# Patient Record
Sex: FEMALE | Race: WHITE | NOT HISPANIC OR LATINO | Employment: UNEMPLOYED | URBAN - METROPOLITAN AREA
[De-identification: names, ages, dates, MRNs, and addresses within clinical notes are randomized per-mention and may not be internally consistent; named-entity substitution may affect disease eponyms.]

---

## 2022-08-29 ENCOUNTER — HOSPITAL ENCOUNTER (EMERGENCY)
Facility: HOSPITAL | Age: 32
Discharge: HOME | End: 2022-08-29
Attending: STUDENT IN AN ORGANIZED HEALTH CARE EDUCATION/TRAINING PROGRAM | Admitting: STUDENT IN AN ORGANIZED HEALTH CARE EDUCATION/TRAINING PROGRAM

## 2022-08-29 VITALS
RESPIRATION RATE: 18 BRPM | HEIGHT: 64 IN | OXYGEN SATURATION: 98 % | BODY MASS INDEX: 17.43 KG/M2 | WEIGHT: 102.07 LBS | TEMPERATURE: 98.8 F | HEART RATE: 84 BPM | SYSTOLIC BLOOD PRESSURE: 101 MMHG | DIASTOLIC BLOOD PRESSURE: 64 MMHG

## 2022-08-29 DIAGNOSIS — H65.02 NON-RECURRENT ACUTE SEROUS OTITIS MEDIA OF LEFT EAR: Primary | ICD-10-CM

## 2022-08-29 PROCEDURE — 99282 EMERGENCY DEPT VISIT SF MDM: CPT

## 2022-08-29 RX ORDER — AMOXICILLIN 500 MG/1
500 CAPSULE ORAL 2 TIMES DAILY
Qty: 20 CAPSULE | Refills: 0 | Status: SHIPPED | OUTPATIENT
Start: 2022-08-29 | End: 2022-09-08

## 2022-08-29 RX ORDER — AMOXICILLIN 500 MG/1
500 CAPSULE ORAL 2 TIMES DAILY
Qty: 20 CAPSULE | Refills: 0 | Status: SHIPPED | OUTPATIENT
Start: 2022-08-29 | End: 2022-08-29 | Stop reason: SDUPTHER

## 2022-09-24 PROCEDURE — 87086 URINE CULTURE/COLONY COUNT: CPT

## 2022-10-27 ENCOUNTER — TELEPHONE (OUTPATIENT)
Dept: FAMILY MEDICINE CLINIC | Facility: CLINIC | Age: 32
End: 2022-10-27

## 2022-10-27 NOTE — TELEPHONE ENCOUNTER
SPOKE WITH PATIENT IN REGARDS TO MISSED APPOINTMENT ON October 18TH @ 1 PM. PATIENT DID NOT WANT TO RESCHEDULE APPOINTMENT. PATIENT IS AWARE OF NO SHOW CANCELLATION POLICY.

## 2023-07-31 ENCOUNTER — TELEPHONE (OUTPATIENT)
Dept: FAMILY MEDICINE CLINIC | Facility: CLINIC | Age: 33
End: 2023-07-31
Payer: COMMERCIAL

## 2023-07-31 DIAGNOSIS — F31.62 BIPOLAR DISORDER, CURRENT EPISODE MIXED, MODERATE: ICD-10-CM

## 2023-07-31 RX ORDER — LURASIDONE HYDROCHLORIDE 40 MG/1
40 TABLET, FILM COATED ORAL DAILY
Qty: 30 TABLET | Refills: 2 | Status: SHIPPED | OUTPATIENT
Start: 2023-07-31

## 2023-08-23 ENCOUNTER — OFFICE VISIT (OUTPATIENT)
Dept: FAMILY MEDICINE CLINIC | Facility: CLINIC | Age: 33
End: 2023-08-23
Payer: COMMERCIAL

## 2023-08-23 VITALS
BODY MASS INDEX: 18.27 KG/M2 | WEIGHT: 107 LBS | OXYGEN SATURATION: 98 % | HEIGHT: 64 IN | HEART RATE: 67 BPM | TEMPERATURE: 97.9 F

## 2023-08-23 DIAGNOSIS — H66.006 RECURRENT ACUTE SUPPURATIVE OTITIS MEDIA WITHOUT SPONTANEOUS RUPTURE OF TYMPANIC MEMBRANE OF BOTH SIDES: Primary | ICD-10-CM

## 2023-08-23 PROCEDURE — 99213 OFFICE O/P EST LOW 20 MIN: CPT | Performed by: NURSE PRACTITIONER

## 2023-08-23 RX ORDER — CIPROFLOXACIN AND DEXAMETHASONE 3; 1 MG/ML; MG/ML
4 SUSPENSION/ DROPS AURICULAR (OTIC) 2 TIMES DAILY
Qty: 7.5 ML | Refills: 0 | Status: SHIPPED | OUTPATIENT
Start: 2023-08-23

## 2023-08-23 RX ORDER — FLUCONAZOLE 150 MG/1
150 TABLET ORAL ONCE
Qty: 1 TABLET | Refills: 0 | Status: SHIPPED | OUTPATIENT
Start: 2023-08-23 | End: 2023-08-23

## 2023-08-23 RX ORDER — AMOXICILLIN AND CLAVULANATE POTASSIUM 875; 125 MG/1; MG/1
1 TABLET, FILM COATED ORAL 2 TIMES DAILY
Qty: 20 TABLET | Refills: 0 | Status: SHIPPED | OUTPATIENT
Start: 2023-08-23

## 2023-08-23 NOTE — PROGRESS NOTES
"Chief Complaint  Earache (Bilateral earache, pt reports mainly Rt ear)    Subjective        Medical History: has a past medical history of Anxiety and Bipolar depression.     Surgical History: has a past surgical history that includes  section and Laparoscopic tubal ligation.     Family History: family history is not on file.     Social History: reports that she has been smoking cigarettes. She has a 10.00 pack-year smoking history. She has never used smokeless tobacco. She reports current alcohol use. She reports that she does not use drugs.    Ange Reilly presents to [facility]  History of Present Illness  r  Earache   There is pain in both (right worse than left) ears. This is a new problem. The current episode started yesterday. The problem occurs constantly. The problem has been waxing and waning. There has been no fever. The pain is moderate. Pertinent negatives include no abdominal pain, coughing, headaches, neck pain, sore throat or vomiting. She has tried nothing for the symptoms. The treatment provided no relief. Her past medical history is significant for a chronic ear infection.     Objective   Vital Signs:   Pulse 67   Temp 97.9 øF (36.6 øC) (Temporal)   Ht 162.6 cm (64\")   Wt 48.5 kg (107 lb)   SpO2 98%   BMI 18.37 kg/mý       Wt Readings from Last 3 Encounters:   23 48.5 kg (107 lb)   23 48.1 kg (106 lb)   23 49.1 kg (108 lb 3.2 oz)        BP Readings from Last 3 Encounters:   23 90/64   23 90/56   22 116/65        BMI is below normal parameters (malnutrition). Recommendations: none (medical contraindication)       Physical Exam  Vitals reviewed.   Constitutional:       Appearance: Normal appearance. She is well-developed.   HENT:      Head: Normocephalic and atraumatic.      Right Ear: No drainage. A middle ear effusion is present. Tympanic membrane is injected and erythematous. Tympanic membrane is not perforated. "      Left Ear: No drainage. A middle ear effusion is present. Tympanic membrane is injected and erythematous. Tympanic membrane is not perforated.   Eyes:      Conjunctiva/sclera: Conjunctivae normal.      Pupils: Pupils are equal, round, and reactive to light.   Cardiovascular:      Rate and Rhythm: Normal rate and regular rhythm.      Heart sounds: No murmur heard.  Pulmonary:      Effort: Pulmonary effort is normal.      Breath sounds: Normal breath sounds. No wheezing or rhonchi.   Skin:     General: Skin is warm and dry.   Neurological:      Mental Status: She is alert and oriented to person, place, and time.   Psychiatric:         Mood and Affect: Mood and affect normal.         Behavior: Behavior normal.         Thought Content: Thought content normal.         Judgment: Judgment normal.      Result Review :  {The following data was reviewed by ARIES Chapman on 08/23/2023.  Common labs          7/11/2023    11:35   Common Labs   Glucose 92    BUN 6    Creatinine 0.71    Sodium 142    Potassium 4.1    Chloride 106    Calcium 9.5    Albumin 4.8    Total Bilirubin 1.2    Alkaline Phosphatase 69    AST (SGOT) 16    ALT (SGPT) 11    WBC 4.00    Hemoglobin 15.3    Hematocrit 44.5    Platelets 249    Total Cholesterol 155    Triglycerides 67    HDL Cholesterol 44    LDL Cholesterol  98      Data reviewed : Previous urgent care notes             Current Outpatient Medications on File Prior to Visit   Medication Sig Dispense Refill    fluticasone (FLONASE) 50 MCG/ACT nasal spray 2 sprays into the nostril(s) as directed by provider Daily. 16 g 2    Lurasidone HCl (LATUDA) 40 MG tablet tablet Take 1 tablet by mouth Daily. 30 tablet 2    cetirizine (zyrTEC) 10 MG tablet Take 1 tablet by mouth Daily. (Patient not taking: Reported on 8/23/2023) 30 tablet 5     No current facility-administered medications on file prior to visit.        Assessment and Plan  Diagnoses and all orders for this visit:    1. Recurrent  acute suppurative otitis media without spontaneous rupture of tympanic membrane of both sides (Primary)    Other orders  -     ciprofloxacin-dexAMETHasone (Ciprodex) 0.3-0.1 % otic suspension; Administer 4 drops into both ears 2 (Two) Times a Day.  Dispense: 7.5 mL; Refill: 0  -     amoxicillin-clavulanate (AUGMENTIN) 875-125 MG per tablet; Take 1 tablet by mouth 2 (Two) Times a Day.  Dispense: 20 tablet; Refill: 0  -     fluconazole (Diflucan) 150 MG tablet; Take 1 tablet by mouth 1 (One) Time for 1 dose.  Dispense: 1 tablet; Refill: 0    Patient has an appointment coming up with ENT January 2024.  She states she has been getting recurrent ear infections for the last several years, had childhood history of chronic ear infection with tube placement.  We will start patient on Augmentin, she does at times get yeast infection we will send over Diflucan and also start on ciprofloxacin eardrops to help with the infection.  Discussed return precautions.  Patient verbalized understanding.    Follow Up   No follow-ups on file.  Patient was given instructions and counseling regarding her condition or for health maintenance advice. Please see specific information pulled into the AVS if appropriate.       Part of this note may be electronic transcription/translation of spoken language to printed text using the Dragon dictation system

## 2023-12-13 ENCOUNTER — OFFICE VISIT (OUTPATIENT)
Dept: FAMILY MEDICINE CLINIC | Facility: CLINIC | Age: 33
End: 2023-12-13
Payer: COMMERCIAL

## 2023-12-13 VITALS
OXYGEN SATURATION: 97 % | SYSTOLIC BLOOD PRESSURE: 102 MMHG | TEMPERATURE: 98 F | HEIGHT: 64 IN | HEART RATE: 67 BPM | BODY MASS INDEX: 19.31 KG/M2 | WEIGHT: 113.1 LBS | DIASTOLIC BLOOD PRESSURE: 64 MMHG

## 2023-12-13 DIAGNOSIS — H93.8X3 PRESSURE SENSATION IN BOTH EARS: ICD-10-CM

## 2023-12-13 DIAGNOSIS — R05.1 ACUTE COUGH: ICD-10-CM

## 2023-12-13 DIAGNOSIS — R09.81 SINUS CONGESTION: ICD-10-CM

## 2023-12-13 DIAGNOSIS — R51.9 HEADACHE AROUND THE EYES: ICD-10-CM

## 2023-12-13 DIAGNOSIS — J01.10 ACUTE NON-RECURRENT FRONTAL SINUSITIS: Primary | ICD-10-CM

## 2023-12-13 PROCEDURE — 99213 OFFICE O/P EST LOW 20 MIN: CPT

## 2023-12-13 PROCEDURE — 1159F MED LIST DOCD IN RCRD: CPT

## 2023-12-13 PROCEDURE — 1160F RVW MEDS BY RX/DR IN RCRD: CPT

## 2023-12-13 RX ORDER — TRAZODONE HYDROCHLORIDE 50 MG/1
50 TABLET ORAL
COMMUNITY
Start: 2023-12-04

## 2023-12-13 RX ORDER — PRAZOSIN HYDROCHLORIDE 1 MG/1
1 CAPSULE ORAL
COMMUNITY
Start: 2023-12-04

## 2023-12-13 RX ORDER — GUAIFENESIN AND DEXTROMETHORPHAN HYDROBROMIDE 600; 30 MG/1; MG/1
2 TABLET, EXTENDED RELEASE ORAL 2 TIMES DAILY
Qty: 28 TABLET | Refills: 0 | Status: SHIPPED | OUTPATIENT
Start: 2023-12-13 | End: 2023-12-20

## 2023-12-13 RX ORDER — HYDROXYZINE HYDROCHLORIDE 10 MG/1
10 TABLET, FILM COATED ORAL 3 TIMES DAILY PRN
COMMUNITY
Start: 2023-12-04

## 2023-12-13 RX ORDER — AMOXICILLIN AND CLAVULANATE POTASSIUM 875; 125 MG/1; MG/1
1 TABLET, FILM COATED ORAL 2 TIMES DAILY
Qty: 14 TABLET | Refills: 0 | Status: SHIPPED | OUTPATIENT
Start: 2023-12-13 | End: 2023-12-20

## 2023-12-13 NOTE — PROGRESS NOTES
"Chief Complaint  Chief Complaint   Patient presents with    Headache    Cough    Nasal Congestion    Earache       Subjective      Ange Reilly presents to [facility] with complaints of sinus congestion, cough, headache, and bilateral ear pain and pressure.    She has not been feeling well since this weekend, 2023 to 12/10/2023. She believed she had an upper respiratory infection. She was off all weekend and rested. She has had a severe frontal headache since Monday, 2023. She does not feel feverish, but she \"feels hot.\" She has nasal congestion, a sore throat, bilateral ear pain and pressure, and had rhinorrhea this morning, 2023, with green mucus. She is not expectorating phlegm. She has sinus infections with weather changes. Her children have been sick. She feels pain in her ears when the wind blows. She is not taking any over-the-counter medications. She denies any GI symptoms, nausea, or vomiting. Augmentin worked well for her for her sinus infection in 2023. She declines influenza and COVID-19 testing.     Objective     Medical History:  Past Medical History:   Diagnosis Date    Anxiety     Bipolar depression      Past Surgical History:   Procedure Laterality Date     SECTION      LAPAROSCOPIC TUBAL LIGATION        Social History     Tobacco Use    Smoking status: Every Day     Packs/day: 1.00     Years: 10.00     Additional pack years: 0.00     Total pack years: 10.00     Types: Cigarettes    Smokeless tobacco: Never   Vaping Use    Vaping Use: Every day    Substances: Nicotine   Substance Use Topics    Alcohol use: Yes     Comment: Occ    Drug use: Never     History reviewed. No pertinent family history.    Medications:  Prior to Admission medications    Medication Sig Start Date End Date Taking? Authorizing Provider   cetirizine (zyrTEC) 10 MG tablet Take 1 tablet by mouth Daily. 23  Yes Bev Rojas APRN   fluticasone " "(FLONASE) 50 MCG/ACT nasal spray 2 sprays into the nostril(s) as directed by provider Daily. 7/11/23  Yes Bev Rojas APRN   hydrOXYzine (ATARAX) 10 MG tablet Take 1 tablet by mouth 3 (Three) Times a Day As Needed for Anxiety. for anxiety 12/4/23  Yes ProviderKatharina MD   prazosin (MINIPRESS) 1 MG capsule Take 1 capsule by mouth every night at bedtime. 12/4/23  Yes Provider, MD Katharina   sertraline (ZOLOFT) 50 MG tablet Take 1 tablet by mouth every night at bedtime. 12/4/23  Yes ProviderKatharina MD   traZODone (DESYREL) 50 MG tablet Take 1 tablet by mouth every night at bedtime. 12/4/23  Yes ProviderKatharina MD   amoxicillin-clavulanate (AUGMENTIN) 875-125 MG per tablet Take 1 tablet by mouth 2 (Two) Times a Day. 8/23/23   Tracy Selby APRN   ciprofloxacin-dexAMETHasone (Ciprodex) 0.3-0.1 % otic suspension Administer 4 drops into both ears 2 (Two) Times a Day.  Patient not taking: Reported on 12/13/2023 8/23/23   Tracy Selby APRN   Lurasidone HCl (LATUDA) 40 MG tablet tablet Take 1 tablet by mouth Daily.  Patient not taking: Reported on 12/13/2023 7/31/23   Bev Rojas APRN        Allergies:   Patient has no known allergies.    Health Maintenance Due   Topic Date Due    Pneumococcal Vaccine 0-64 (1 - PCV) Never done    TDAP/TD VACCINES (1 - Tdap) Never done    HEPATITIS C SCREENING  Never done    PAP SMEAR  Never done    INFLUENZA VACCINE  Never done         Vital Signs:   /64   Pulse 67   Temp 98 °F (36.7 °C)   Ht 162.6 cm (64\")   Wt 51.3 kg (113 lb 1.6 oz)   SpO2 97%   BMI 19.41 kg/m²     Wt Readings from Last 3 Encounters:   12/13/23 51.3 kg (113 lb 1.6 oz)   08/23/23 48.5 kg (107 lb)   07/11/23 48.1 kg (106 lb)     BP Readings from Last 3 Encounters:   12/13/23 102/64   07/11/23 90/64   05/30/23 90/56       BMI is within normal parameters. No other follow-up for BMI required.       Physical Exam  Vitals reviewed.   Constitutional:       " Appearance: Normal appearance. She is well-developed.   HENT:      Head: Normocephalic and atraumatic.      Right Ear: Tympanic membrane normal. No tenderness.      Left Ear: Tympanic membrane normal. No tenderness.      Nose: Congestion present.      Right Sinus: Frontal sinus tenderness present.      Left Sinus: Frontal sinus tenderness present.      Mouth/Throat:      Pharynx: Posterior oropharyngeal erythema present.   Eyes:      Conjunctiva/sclera: Conjunctivae normal.      Pupils: Pupils are equal, round, and reactive to light.   Cardiovascular:      Rate and Rhythm: Normal rate and regular rhythm.      Heart sounds: No murmur heard.     No friction rub. No gallop.   Pulmonary:      Effort: Pulmonary effort is normal.      Breath sounds: Normal breath sounds. No wheezing or rhonchi.   Skin:     General: Skin is warm and dry.   Neurological:      Mental Status: She is alert.   Psychiatric:         Mood and Affect: Affect normal.         Result Review :    The following data was reviewed by ARIES Gilliam on 12/14/23 at 08:15 EST:        No Images in the past 120 days found..             Assessment and Plan    Diagnoses and all orders for this visit:    1. Acute non-recurrent frontal sinusitis (Primary)  -     amoxicillin-clavulanate (AUGMENTIN) 875-125 MG per tablet; Take 1 tablet by mouth 2 (Two) Times a Day for 7 days.  Dispense: 14 tablet; Refill: 0  -     guaifenesin-dextromethorphan (MUCINEX DM)  MG tablet sustained-release 12 hour tablet; Take 2 tablets by mouth 2 (Two) Times a Day for 7 days.  Dispense: 28 tablet; Refill: 0    2. Sinus congestion    3. Acute cough    4. Headache around the eyes    5. Pressure sensation in both ears       1. Sinusitis.   Augmentin twice daily for 1 week and Mucinex DM will be prescribed. She was advised to drink plenty of water.        Follow Up   Return if symptoms worsen or fail to improve.  Patient was given instructions and counseling regarding her  condition or for health maintenance advice. Please see specific information pulled into the AVS if appropriate.     Please note that portions of this note were completed with a voice recognition program.    Transcribed from ambient dictation for ARIES Gilliam by Lorena Sow.  12/13/23   17:34 EST    Patient or patient representative verbalized consent to the visit recording.  I have personally performed the services described in this document as transcribed by the above individual, and it is both accurate and complete.

## 2024-06-20 ENCOUNTER — OFFICE VISIT (OUTPATIENT)
Dept: OTOLARYNGOLOGY | Facility: CLINIC | Age: 34
End: 2024-06-20
Payer: COMMERCIAL

## 2024-06-20 ENCOUNTER — PROCEDURE VISIT (OUTPATIENT)
Dept: OTOLARYNGOLOGY | Facility: CLINIC | Age: 34
End: 2024-06-20
Payer: COMMERCIAL

## 2024-06-20 VITALS — BODY MASS INDEX: 19.15 KG/M2 | TEMPERATURE: 97.2 F | WEIGHT: 112.2 LBS | HEIGHT: 64 IN

## 2024-06-20 DIAGNOSIS — H69.93 DYSFUNCTION OF BOTH EUSTACHIAN TUBES: Primary | ICD-10-CM

## 2024-06-20 DIAGNOSIS — H90.3 SENSORINEURAL HEARING LOSS (SNHL) OF BOTH EARS: ICD-10-CM

## 2024-06-20 DIAGNOSIS — Z72.0 TOBACCO ABUSE: ICD-10-CM

## 2024-06-20 DIAGNOSIS — H90.A32 MIXED CONDUCTIVE AND SENSORINEURAL HEARING LOSS OF LEFT EAR WITH RESTRICTED HEARING OF RIGHT EAR: ICD-10-CM

## 2024-06-20 DIAGNOSIS — H90.A31 MIXED CONDUCTIVE AND SENSORINEURAL HEARING LOSS OF RIGHT EAR WITH RESTRICTED HEARING OF LEFT EAR: ICD-10-CM

## 2024-06-20 RX ORDER — HYDROXYZINE HYDROCHLORIDE 25 MG/1
25 TABLET, FILM COATED ORAL 3 TIMES DAILY PRN
COMMUNITY
Start: 2024-03-05

## 2024-06-20 RX ORDER — MIRTAZAPINE 15 MG/1
15 TABLET, FILM COATED ORAL
COMMUNITY
Start: 2024-03-05

## 2024-06-20 RX ORDER — CITALOPRAM HYDROBROMIDE 10 MG/1
10 TABLET ORAL
COMMUNITY
Start: 2024-03-05

## 2024-06-20 NOTE — PROGRESS NOTES
Patient Name: Ange Reilly   Visit Date: 2024   Patient ID: [id number]  Provider: Eliseo Cross MD    Sex: female  Location: [facility]   YOB: 1990  Location Address: [address] [place] [address], [address],?[place]?[id number]    Primary Care Provider Bev Rojas, ARIES  Location Phone: [phone]    Referring Provider: Bev Rojas, *        Chief Complaint   Cholesteatoma of ear and Hearing Loss (New Patient )    Subjective    History of Present Illness  Ange Reilly is a 33 y.o. female who presents to [facility] today as a consult from Bev Rojas, *.    She presents to the clinic today for evaluation of her ears and hearing.  I see her daughter is one of my patients who has some difficulty with eustachian tube dysfunction.  She informs me that she also had difficulty with recurrent ear infections when she was a child, did have ear tubes placed, and her adenoids removed for this.  Her left ear tube extruded within a few years, but the right ear tube persisted and she had a subsequent surgery when she was around 19 years of age to have the tube removed and the eardrum patched.  She seems to be doing well in general and is not having frequent infections, but notes that she has some difficulty with her hearing, slightly more so on the right side.    Past Medical History:   Diagnosis Date    Anxiety     Bipolar depression        Past Surgical History:   Procedure Laterality Date    ADENOIDECTOMY       SECTION      EAR TUBES      LAPAROSCOPIC TUBAL LIGATION      TONSILLECTOMY           Current Outpatient Medications:     cetirizine (zyrTEC) 10 MG tablet, Take 1 tablet by mouth Daily., Disp: 30 tablet, Rfl: 5    citalopram (CeleXA) 10 MG tablet, Take 1 tablet by mouth every night at bedtime., Disp: , Rfl:     fluticasone (FLONASE) 50 MCG/ACT nasal spray, 2 sprays into the nostril(s) as directed by  "provider Daily., Disp: 16 g, Rfl: 2    hydrOXYzine (ATARAX) 25 MG tablet, Take 1 tablet by mouth 3 (Three) Times a Day As Needed. for anxiety, Disp: , Rfl:     mirtazapine (REMERON) 15 MG tablet, Take 1 tablet by mouth every night at bedtime., Disp: , Rfl:     prazosin (MINIPRESS) 1 MG capsule, Take 1 capsule by mouth every night at bedtime., Disp: , Rfl:     sertraline (ZOLOFT) 50 MG tablet, Take 1 tablet by mouth every night at bedtime., Disp: , Rfl:     traZODone (DESYREL) 50 MG tablet, Take 1 tablet by mouth every night at bedtime., Disp: , Rfl:     ciprofloxacin-dexAMETHasone (Ciprodex) 0.3-0.1 % otic suspension, Administer 4 drops into both ears 2 (Two) Times a Day. (Patient not taking: Reported on 12/13/2023), Disp: 7.5 mL, Rfl: 0     No Known Allergies    Family History   Family history unknown: Yes        Social History     Social History Narrative    Not on file       Objective     Vital Signs:   Temp 97.2 °F (36.2 °C) (Tympanic)   Ht 162.6 cm (64\")   Wt 50.9 kg (112 lb 3.2 oz)   BMI 19.26 kg/m²       Physical Exam    Constitutional   Appearance  : well developed, well-nourished, alert and in no acute distress, voice clear and strong    Head  Inspection  : no deformities or lesions  Face  Inspection  : No facial lesions; House-Brackmann I/VI bilaterally  Palpation  : No TMJ crepitus nor  muscle tenderness bilaterally    Eyes  Vision  Visual Fields  : Extraocular movements are intact. No spontaneous or gaze-induced nystagmus.  Conjunctivae  : clear  Sclerae  : clear  Pupils and Irises  : pupils equal, round, and reactive to light.     Ears, Nose, Mouth and Throat    Ears    External Ears  : appearance within normal limits, no lesions present  Otoscopic Examination  : Tympanic membrane appearance with myringosclerosis bilaterally otherwise within normal limits bilaterally without perforations, well-aerated middle ears  Hearing  : intact to conversational voice both ears  Tunning fork " testing:     :    Nose    External Nose  : appearance normal  Intranasal Exam  : mucosa within normal limits, vestibules normal, no intranasal lesions present, septum midline, sinuses non tender to percussion  Oral Cavity    Oral Mucosa  : oral mucosa normal without pallor or cyanosis  Lips  : lip appearance normal  Teeth  : normal dentition for age  Gums  : gums pink, non-swollen, no bleeding present  Tongue  : tongue appearance normal; normal mobility  Palate  : hard palate normal, soft palate appearance normal with symmetric mobility    Throat    Oropharynx  : no inflammation or lesions present, tonsils surgically absent with well-healed tonsillar fossa  Hypopharynx  : appearance within normal limits, superior epiglottis within normal limits  Larynx  : appearance within normal limits, vocal cords within normal limits, no lesions present    Neck  Inspection/Palpation  : normal appearance, no masses or tenderness, trachea midline; thyroid size normal, nontender, no nodules or masses present on palpation    Respiratory  Respiratory Effort  : breathing unlabored  Inspection of Chest  : normal appearance, no retractions    Cardiovascular  Heart  : regular rate and rhythm    Lymphatic  Neck  : no lymphadenopathy present  Supraclavicular Nodes  : no lymphadenopathy present  Preauricular Nodes  : no lymphadenopathy present    Skin and Subcutaneous Tissue  General Inspection  : Regarding face and neck - there are no rashes present, no lesions present, and no areas of discoloration    Neurologic  Cranial Nerves  : cranial nerves II-XII are grossly intact bilaterally  Gait and Station  : normal gait, able to stand without diffculty    Psychiatric  Judgement and Insight  : judgment and insight intact  Mood and Affect  : mood normal, affect appropriate              Assessment and Plan    Diagnoses and all orders for this visit:    1. Dysfunction of both eustachian tubes (Primary)  -     Comprehensive Hearing Test; Future  -      Tympanometry; Future    2. Sensorineural hearing loss (SNHL) of both ears  -     Comprehensive Hearing Test; Future  -     Tympanometry; Future    Examination today revealed myringosclerosis on both sides, slightly worse on the right side.  She does have some mild retraction on the left, but otherwise well aerated middle ears on both sides.  I discussed the findings with her, did recommend an audiogram which was performed today.  This reveals mild to moderate mixed hearing loss in both ears with tympanometry indicative of restricted tympanic membrane movement.  Word discrimination scores are 100% bilaterally.  I discussed the audiogram findings and since her ears do look stable I think a lot of the issues she is having stems from previous infections as a child.  There is no evidence of fluid on exam.  I did recommend hearing aids for her if she is having difficulty hearing due to the, and provided her with a number of audiologist here in town that can help her.  I will be glad to see her back on an as-needed basis should she have any continued difficulties.  We discussed smoking cessation and how this can impact her ears, and she will try to cut down with a goal to quit.    Follow Up   No follow-ups on file.  Patient was given instructions and counseling regarding her condition or for health maintenance advice. Please see specific information pulled into the AVS if appropriate.

## 2024-06-20 NOTE — PROGRESS NOTES
AUDIOMETRIC EVALUATION      Name:  Ange Reilly  :  1990  Age:  33 y.o.  Date of Evaluation:  2024       History:  Ms. Reilly is seen today for a hearing evaluation due to history of otitis media.    Audiologic Information:  Concerns for Hearing: Yes  PETs: Currently no  Other otologic surgical history: None  Aural Pressure/Fullness: Yes  Otalgia: No  Otorrhea: None noted  Tinnitus: No  Dizziness: None  Noise Exposure: No  Family history of hearing loss: No  Head trauma requiring hospital stay: No  Chemotherapy: None  Other significant history: Multiple PE tubes as a child    EVALUATION:    See audiogram    RESULTS:    Otoscopic Evaluation:        NOTE: Testing completed after ears were examined by Dr. Morales.    Tympanometry (226 Hz):  Right: Type B, Normal ECV  Left: Type B, Normal ECV    IMPRESSIONS:  Pure tone thresholds for the right ear indicates a mild and moderate mixed hearing loss.  Pure tone thresholds for the left ear indicates a mild and moderate mixed hearing loss at most speech tones.  Patient was counseled with regard to the findings.    RECOMMENDATIONS/PLAN:  Follow-up recommendations of Dr. Silva.  Discussed results and recommendations with patient. Questions were addressed and the patient was encouraged to contact our department should concerns arise.          Cornell Saenz M.S, [facility]  Licensed Audiologist

## 2024-06-21 PROBLEM — H90.3 SENSORINEURAL HEARING LOSS (SNHL) OF BOTH EARS: Status: ACTIVE | Noted: 2024-06-21

## 2024-06-21 PROBLEM — Z72.0 TOBACCO ABUSE: Status: ACTIVE | Noted: 2024-06-21

## 2024-06-21 PROBLEM — H69.93 DYSFUNCTION OF BOTH EUSTACHIAN TUBES: Status: ACTIVE | Noted: 2024-06-21

## 2024-10-03 ENCOUNTER — OFFICE VISIT (OUTPATIENT)
Dept: FAMILY MEDICINE CLINIC | Facility: CLINIC | Age: 34
End: 2024-10-03
Payer: COMMERCIAL

## 2024-10-03 VITALS
OXYGEN SATURATION: 97 % | HEIGHT: 64 IN | HEART RATE: 80 BPM | WEIGHT: 108.5 LBS | BODY MASS INDEX: 18.52 KG/M2 | SYSTOLIC BLOOD PRESSURE: 94 MMHG | TEMPERATURE: 98 F | DIASTOLIC BLOOD PRESSURE: 64 MMHG

## 2024-10-03 DIAGNOSIS — M25.552 BILATERAL HIP PAIN: ICD-10-CM

## 2024-10-03 DIAGNOSIS — M54.50 CHRONIC MIDLINE LOW BACK PAIN WITHOUT SCIATICA: ICD-10-CM

## 2024-10-03 DIAGNOSIS — G89.29 CHRONIC MIDLINE LOW BACK PAIN WITHOUT SCIATICA: ICD-10-CM

## 2024-10-03 DIAGNOSIS — Z91.414 HISTORY OF ABUSE BY INTIMATE PARTNER IN ADULTHOOD: ICD-10-CM

## 2024-10-03 DIAGNOSIS — Z00.00 ANNUAL PHYSICAL EXAM: Primary | ICD-10-CM

## 2024-10-03 DIAGNOSIS — M25.551 BILATERAL HIP PAIN: ICD-10-CM
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
## 2024-10-03 NOTE — PROGRESS NOTES
Chief Complaint  Chief Complaint   Patient presents with    Annual Exam    Hip Pain     Bilateral hip pain, had injury 3 years ago       Subjective      Ange Reilly presents to [facility]  History of Present Illness        Objective     Medical History:  Past Medical History:   Diagnosis Date    Anxiety     Bipolar depression      Past Surgical History:   Procedure Laterality Date    ADENOIDECTOMY       SECTION      EAR TUBES      LAPAROSCOPIC TUBAL LIGATION      TONSILLECTOMY        Social History     Tobacco Use    Smoking status: Every Day     Current packs/day: 1.00     Average packs/day: 1 pack/day for 10.0 years (10.0 ttl pk-yrs)     Types: Cigarettes     Passive exposure: Current    Smokeless tobacco: Never   Vaping Use    Vaping status: Former   Substance Use Topics    Alcohol use: Yes     Comment: Occ    Drug use: Never     Family History   Family history unknown: Yes       Medications:  Prior to Admission medications    Medication Sig Start Date End Date Taking? Authorizing Provider   cetirizine (zyrTEC) 10 MG tablet Take 1 tablet by mouth Daily. 23  Yes Bev Rojas APRN   citalopram (CeleXA) 10 MG tablet Take 1 tablet by mouth every night at bedtime. 3/5/24  Yes Katharina Grace MD   fluticasone (FLONASE) 50 MCG/ACT nasal spray 2 sprays into the nostril(s) as directed by provider Daily. 23  Yes Bev Rojas APRN   hydrOXYzine (ATARAX) 25 MG tablet Take 1 tablet by mouth 3 (Three) Times a Day As Needed. for anxiety 3/5/24  Yes Katharina Grace MD   mirtazapine (REMERON) 15 MG tablet Take 1 tablet by mouth every night at bedtime. 3/5/24  Yes Katharina Grace MD   prazosin (MINIPRESS) 1 MG capsule Take 1 capsule by mouth every night at bedtime. 23  Yes Katharina Grace MD   sertraline (ZOLOFT) 50 MG tablet Take 1 tablet by mouth every night at bedtime. 23  Yes Provider, Historical, MD   traZODone  "(DESYREL) 50 MG tablet Take 1 tablet by mouth every night at bedtime. 12/4/23  Yes Provider, MD Katharina   ciprofloxacin-dexAMETHasone (Ciprodex) 0.3-0.1 % otic suspension Administer 4 drops into both ears 2 (Two) Times a Day. 8/23/23   Erwin SelbyARIES King        Allergies:   Patient has no known allergies.    Health Maintenance Due   Topic Date Due    Pneumococcal Vaccine 0-64 (1 of 2 - PCV) Never done    TDAP/TD VACCINES (1 - Tdap) Never done    HEPATITIS C SCREENING  Never done    PAP SMEAR  Never done    ANNUAL PHYSICAL  07/11/2024    COVID-19 Vaccine (1 - 2023-24 season) Never done         Vital Signs:   BP 94/64 (BP Location: Left arm, Patient Position: Sitting, Cuff Size: Small Adult)   Pulse 80   Temp 98 °F (36.7 °C) (Oral)   Ht 162.6 cm (64\")   Wt 49.2 kg (108 lb 8 oz)   SpO2 97%   BMI 18.62 kg/m²     Wt Readings from Last 3 Encounters:   10/03/24 49.2 kg (108 lb 8 oz)   06/20/24 50.9 kg (112 lb 3.2 oz)   12/13/23 51.3 kg (113 lb 1.6 oz)     BP Readings from Last 3 Encounters:   10/03/24 94/64   12/13/23 102/64   07/11/23 90/64       BMI is within normal parameters. No other follow-up for BMI required.       Physical Exam  Vitals reviewed.   Constitutional:       Appearance: Normal appearance. She is well-developed.   HENT:      Head: Normocephalic and atraumatic.   Eyes:      Conjunctiva/sclera: Conjunctivae normal.      Pupils: Pupils are equal, round, and reactive to light.   Cardiovascular:      Rate and Rhythm: Normal rate and regular rhythm.      Heart sounds: No murmur heard.     No friction rub. No gallop.   Pulmonary:      Effort: Pulmonary effort is normal.      Breath sounds: Normal breath sounds. No wheezing or rhonchi.   Abdominal:      General: Bowel sounds are normal. There is no distension.      Palpations: Abdomen is soft.      Tenderness: There is no abdominal tenderness.   Skin:     General: Skin is warm and dry.   Neurological:      Mental Status: She is alert and " "oriented to person, place, and time.      Cranial Nerves: No cranial nerve deficit.   Psychiatric:         Mood and Affect: Mood and affect normal.         Behavior: Behavior normal.         Thought Content: Thought content normal.         Judgment: Judgment normal.       Physical Exam        Result Review :    The following data was reviewed by ARIES Gilliam on 10/03/24 at 13:34 EDT:    {[facility] Ambulatory Labs (Optional):49143}    No Images in the past 120 days found..    Results                 Assessment and Plan    There are no diagnoses linked to this encounter.   Assessment & Plan        {Time Spent (Optional):14907}    Smoking Cessation:    Ange Reilly  reports that she has been smoking cigarettes. She has a 10 pack-year smoking history. She has been exposed to tobacco smoke. She has never used smokeless tobacco. I have educated her on the risk of diseases from using tobacco products such as {Tobacco Cessation Diseases:09195::\"cancer\",\"COPD\",\"heart disease\"}.     I advised her to quit and she is {Willing/Not Willing to Quit Tobacco Products:44937}.    I spent {Time Spent Tobacco :17643} minutes counseling the patient.            Follow Up   No follow-ups on file.  Patient was given instructions and counseling regarding her condition or for health maintenance advice. Please see specific information pulled into the AVS if appropriate.     Please note that portions of this note were completed with a voice recognition program.    {[facility] Provider Statement:87755}  "

## 2024-10-08 ENCOUNTER — HOSPITAL ENCOUNTER (OUTPATIENT)
Dept: GENERAL RADIOLOGY | Facility: HOSPITAL | Age: 34
Discharge: HOME | End: 2024-10-08
Payer: COMMERCIAL

## 2024-10-08 DIAGNOSIS — M25.551 BILATERAL HIP PAIN: ICD-10-CM

## 2024-10-08 DIAGNOSIS — Z91.414 HISTORY OF ABUSE BY INTIMATE PARTNER IN ADULTHOOD: ICD-10-CM

## 2024-10-08 DIAGNOSIS — M25.552 BILATERAL HIP PAIN: ICD-10-CM

## 2024-10-08 PROCEDURE — 72110 X-RAY EXAM L-2 SPINE 4/>VWS: CPT

## 2024-10-08 PROCEDURE — 72072 X-RAY EXAM THORAC SPINE 3VWS: CPT

## 2024-10-08 PROCEDURE — 73521 X-RAY EXAM HIPS BI 2 VIEWS: CPT

## 2024-11-05 ENCOUNTER — OFFICE VISIT (OUTPATIENT)
Dept: FAMILY MEDICINE CLINIC | Facility: CLINIC | Age: 34
End: 2024-11-05
Payer: COMMERCIAL

## 2024-11-05 VITALS
HEIGHT: 64 IN | DIASTOLIC BLOOD PRESSURE: 54 MMHG | SYSTOLIC BLOOD PRESSURE: 84 MMHG | WEIGHT: 105.4 LBS | OXYGEN SATURATION: 98 % | TEMPERATURE: 98.2 F | BODY MASS INDEX: 17.99 KG/M2 | HEART RATE: 85 BPM

## 2024-11-05 DIAGNOSIS — I95.9 HYPOTENSION, UNSPECIFIED HYPOTENSION TYPE: ICD-10-CM

## 2024-11-05 DIAGNOSIS — Z01.419 ENCOUNTER FOR PAPANICOLAOU SMEAR OF VAGINA AS PART OF ROUTINE GYNECOLOGICAL EXAMINATION: Primary | ICD-10-CM

## 2024-11-05 DIAGNOSIS — N89.8 VAGINAL DISCHARGE: ICD-10-CM

## 2024-11-05 DIAGNOSIS — Z12.72 ENCOUNTER FOR PAPANICOLAOU SMEAR OF VAGINA AS PART OF ROUTINE GYNECOLOGICAL EXAMINATION: Primary | ICD-10-CM

## 2024-11-05 LAB
CANDIDA SPECIES: NEGATIVE
GARDNERELLA VAGINALIS: POSITIVE
T VAGINALIS DNA VAG QL PROBE+SIG AMP: POSITIVE

## 2024-11-05 PROCEDURE — 99395 PREV VISIT EST AGE 18-39: CPT

## 2024-11-05 PROCEDURE — 87480 CANDIDA DNA DIR PROBE: CPT

## 2024-11-05 PROCEDURE — G0123 SCREEN CERV/VAG THIN LAYER: HCPCS

## 2024-11-05 PROCEDURE — 87660 TRICHOMONAS VAGIN DIR PROBE: CPT

## 2024-11-05 PROCEDURE — 87624 HPV HI-RISK TYP POOLED RSLT: CPT

## 2024-11-05 PROCEDURE — 87510 GARDNER VAG DNA DIR PROBE: CPT

## 2024-11-05 PROCEDURE — 2014F MENTAL STATUS ASSESS: CPT

## 2024-11-05 PROCEDURE — 1125F AMNT PAIN NOTED PAIN PRSNT: CPT

## 2024-11-05 NOTE — PROGRESS NOTES
"  ENCOUNTER DATE:  11/05/2024    Ange NOVOA Tommie / 34 y.o. / female      CHIEF COMPLAINT     Gynecologic Exam      VITALS     Visit Vitals  BP (!) 84/54 (BP Location: Left arm, Patient Position: Sitting, Cuff Size: Small Adult)   Pulse 85   Temp 98.2 °F (36.8 °C) (Oral)   Ht 162.6 cm (64\")   Wt 47.8 kg (105 lb 6.4 oz)   SpO2 98%   BMI 18.09 kg/m²       BP Readings from Last 3 Encounters:   11/05/24 (!) 84/54   10/03/24 94/64   12/13/23 102/64     Wt Readings from Last 3 Encounters:   11/05/24 47.8 kg (105 lb 6.4 oz)   10/03/24 49.2 kg (108 lb 8 oz)   06/20/24 50.9 kg (112 lb 3.2 oz)      Body mass index is 18.09 kg/m².    MEDICATIONS     Current Outpatient Medications on File Prior to Visit   Medication Sig Dispense Refill    cetirizine (zyrTEC) 10 MG tablet Take 1 tablet by mouth Daily. 30 tablet 5    citalopram (CeleXA) 10 MG tablet Take 1 tablet by mouth every night at bedtime.      fluticasone (FLONASE) 50 MCG/ACT nasal spray 2 sprays into the nostril(s) as directed by provider Daily. 16 g 2    hydrOXYzine (ATARAX) 25 MG tablet Take 1 tablet by mouth 3 (Three) Times a Day As Needed. for anxiety      mirtazapine (REMERON) 15 MG tablet Take 1 tablet by mouth every night at bedtime.      prazosin (MINIPRESS) 1 MG capsule Take 1 capsule by mouth every night at bedtime.      sertraline (ZOLOFT) 50 MG tablet Take 1 tablet by mouth every night at bedtime.      traZODone (DESYREL) 50 MG tablet Take 1 tablet by mouth every night at bedtime.       No current facility-administered medications on file prior to visit.         HISTORY OF PRESENT ILLNESS     Ange presents for annual health maintenance visit.    Obstetric History:  OB History    No obstetric history on file.        Menstrual History:     No LMP recorded. (Menstrual status: Tubal ligation).         No results found for: \"HPVAPTIMA\"    History of Present Illness  The patient is a 34-year-old female who comes in today for routine gynecologic exam with Pap " smear.    Her last menstrual period was on 10/22/2024. Her last Pap smear was greater than 5 years ago.  She denies ever having an abnormal Pap smear.  She is currently sexually active with no concern for STDs.  She denies any vaginal concerns at this time.  She has never had a mammogram and denies any significant family history of breast cancer.    She is hypotensive on exam today with a blood pressure of 84/54.  She denies any dizziness, syncope/presyncope.  She does typically have lower blood pressure readings, but not as low as today with a systolic reading in the 80s.  She was prescribed prazosin 1 mg to be taken nightly for nightmares/PTSD.  This is prescribed to her by psychiatry.  She states that she does not take it daily and did not take it recently.  She says that she feels she does eat and drink plenty, although she does drink a lot of coffee.  She also smokes marijuana regularly.            Last health maintenance visit: more than 5 years ago  General health: good  Lifestyle:  Attempting to lose weight?: No   Diet: eats moderately healthy  Exercise: generally stays active (work/exercise)  Tobacco: Regular use (~1 pack/day)   Alcohol: occasional/infrequent  Work: Full-time  Reproductive health:  Sexually active?: Yes   Sexual problems?: No problems  Concern for STD?: No    Sees Gynecologist?: No   Suki/Postmenopausal?: No   Domestic abuse concerns: No   - Depression Screenin/5/2024     2:08 PM   PHQ-2/PHQ-9 Depression Screening   Little interest or pleasure in doing things Not at all   Feeling down, depressed, or hopeless Several days   How difficult have these problems made it for you to do your work, take care of things at home, or get along with other people? Not difficult at all         PHQ-2: 0 (Not depressed)   PHQ-9: 0 (Negative screening for depression)    Patient Care Team:  Bev Rojas APRN as PCP - General (Family Medicine)      ALLERGIES  No Known Allergies     PFSH:      The following portions of the patient's history were reviewed and updated as appropriate: Allergies / Current Medications / Past Medical History / Surgical History / Social History / Family History    PROBLEM LIST   Patient Active Problem List   Diagnosis    Tobacco abuse    Sensorineural hearing loss (SNHL) of both ears    Dysfunction of both eustachian tubes       PAST MEDICAL HISTORY  Past Medical History:   Diagnosis Date    Anxiety     Bipolar depression        SURGICAL HISTORY  Past Surgical History:   Procedure Laterality Date    ADENOIDECTOMY       SECTION      EAR TUBES      LAPAROSCOPIC TUBAL LIGATION      TONSILLECTOMY         SOCIAL HISTORY  Social History     Socioeconomic History    Marital status: Legally    Tobacco Use    Smoking status: Every Day     Current packs/day: 1.00     Average packs/day: 1 pack/day for 10.0 years (10.0 ttl pk-yrs)     Types: Cigarettes     Passive exposure: Current    Smokeless tobacco: Never   Vaping Use    Vaping status: Some Days    Substances: Flavoring    Devices: Disposable   Substance and Sexual Activity    Alcohol use: Yes     Comment: Occ    Drug use: Never    Sexual activity: Defer       FAMILY HISTORY  Family History   Family history unknown: Yes       IMMUNIZATION HISTORY    There is no immunization history on file for this patient.      PHYSICAL EXAMINATION     Physical Exam  Vitals reviewed. Exam conducted with a chaperone present.   Constitutional:       General: She is not in acute distress.     Appearance: Normal appearance. She is well-developed. She is not diaphoretic.   HENT:      Head: Normocephalic and atraumatic. Hair is normal.      Right Ear: Hearing normal. No decreased hearing noted. No drainage.      Left Ear: Hearing normal. No decreased hearing noted.      Nose: Nose normal. No nasal deformity.      Mouth/Throat:      Mouth: Mucous membranes are moist.   Eyes:      General: Lids are normal.      Extraocular Movements:  Extraocular movements intact.      Conjunctiva/sclera: Conjunctivae normal.      Pupils: Pupils are equal, round, and reactive to light.   Cardiovascular:      Rate and Rhythm: Normal rate and regular rhythm.      Pulses: Normal pulses.      Heart sounds: Normal heart sounds. No murmur heard.     No friction rub. No gallop.   Pulmonary:      Effort: Pulmonary effort is normal. No respiratory distress.      Breath sounds: Normal breath sounds. No wheezing or rales.   Chest:   Breasts:     Breasts are symmetrical.      Right: Normal. No mass, nipple discharge, skin change or tenderness.      Left: Normal. No mass, nipple discharge, skin change or tenderness.   Abdominal:      General: Bowel sounds are normal.      Palpations: Abdomen is soft.   Genitourinary:     General: Normal vulva.      Pubic Area: No rash.       Labia:         Right: No rash or lesion.         Left: No rash or lesion.       Urethra: No urethral swelling.      Vagina: Normal. Vaginal discharge present. No lesions.      Cervix: No discharge or cervical bleeding.      Uterus: Normal. Not tender.       Adnexa: Right adnexa normal and left adnexa normal.        Right: No tenderness.          Left: No tenderness.        Rectum: Normal.   Musculoskeletal:         General: No tenderness or deformity. Normal range of motion.      Cervical back: Normal range of motion and neck supple.   Skin:     General: Skin is warm and dry.      Findings: No erythema or rash.   Neurological:      Mental Status: She is alert and oriented to person, place, and time.      Motor: No abnormal muscle tone.      Deep Tendon Reflexes: Reflexes are normal and symmetric.   Psychiatric:         Mood and Affect: Mood normal.         Behavior: Behavior normal.         Thought Content: Thought content normal.         Judgment: Judgment normal.       Physical Exam        REVIEWED DATA      Labs:    Lab Results   Component Value Date     07/11/2023    K 4.1 07/11/2023    CALCIUM  "9.5 07/11/2023    AST 16 07/11/2023    ALT 11 07/11/2023    BUN 6 07/11/2023    CREATININE 0.71 07/11/2023       Lab Results   Component Value Date    TSH 1.020 07/11/2023       Lab Results   Component Value Date    LDL 98 07/11/2023    HDL 44 07/11/2023    TRIG 67 07/11/2023       No results found for: \"SMLV65OT\"     Lab Results   Component Value Date    WBC 4.00 07/11/2023    HGB 15.3 07/11/2023    MCV 92.7 07/11/2023     07/11/2023       Lab Results   Component Value Date    LEUKOCYTESUR Small (1+) (A) 09/24/2022        No results found for: \"HEPCVIRUSABY\"    Results          ASSESSMENT & PLAN     ANNUAL WELLNESS EXAM / PHYSICAL  Diagnoses and all orders for this visit:    1. Encounter for Papanicolaou smear of vagina as part of routine gynecological examination (Primary)  -     IgP, Aptima HPV    2. Vaginal discharge  -     Gardnerella vaginalis, Trichomonas vaginalis, Candida albicans, DNA - Swab, Vagina    3. Hypotension, unspecified hypotension type      Patient counseled on hypotension and possible causes including hypovolemia, use of prazosin, use of marijuana.  She denies any associated symptoms.  She was encouraged to discuss the potential effect of the prazosin on her blood pressure with her psychiatrist who prescribes her prazosin.  Assessment & Plan        HEALTHCARE MAINTENANCE ISSUES     Cancer Screening:  Colon: Initial/Next screening at age: 42  Repeat colon cancer screening: every 3 years  Breast: Recommended monthly self exams; annual professional exam  Mammogram: N/A at this time  Cervical: 3 years  Skin: Monthly self skin examination, annual exam by health professional      Lifestyle Counseling:  Lifestyle Modifications: Improve dietary compliance, Decrease or avoid alcohol intake, Quit smoking, Improve sleep hygiene, Reduce exposure to stress if possible, Discussed better management of stress/anxiety, and Discussed sexual issues, safe sex practices, contraception  Safety Issues: Always " wear seatbelt, Avoid texting while driving   Use sunscreen, regular skin examination  Recommended annual dental/vision examination.  Emotional/Stress/Sleep: Reviewed and  given when appropriate    Patient or patient representative verbalized consent for the use of Ambient Listening during the visit with  ARIES Gilliam for chart documentation. 11/5/2024  14:35 EST

## 2024-11-06 DIAGNOSIS — B96.89 BV (BACTERIAL VAGINOSIS): Primary | ICD-10-CM

## 2024-11-06 DIAGNOSIS — N76.0 BV (BACTERIAL VAGINOSIS): Primary | ICD-10-CM

## 2024-11-06 DIAGNOSIS — A59.01 TRICHOMONAS VAGINITIS: ICD-10-CM

## 2024-11-06 RX ORDER — METRONIDAZOLE 500 MG/1
500 TABLET ORAL 2 TIMES DAILY
Qty: 14 TABLET | Refills: 0 | Status: SHIPPED | OUTPATIENT
Start: 2024-11-06 | End: 2024-11-13

## 2024-11-14 LAB
CYTOLOGIST CVX/VAG CYTO: NORMAL
CYTOLOGY CVX/VAG DOC CYTO: NORMAL
CYTOLOGY CVX/VAG DOC THIN PREP: NORMAL
DX ICD CODE: NORMAL
HPV I/H RISK 4 DNA CVX QL PROBE+SIG AMP: NEGATIVE
Lab: NORMAL
OTHER STN SPEC: NORMAL
PATHOLOGIST CVX/VAG CYTO: NORMAL
STAT OF ADQ CVX/VAG CYTO-IMP: NORMAL

## 2025-04-08 ENCOUNTER — OFFICE VISIT (OUTPATIENT)
Dept: FAMILY MEDICINE CLINIC | Facility: CLINIC | Age: 35
End: 2025-04-08
Payer: COMMERCIAL

## 2025-04-08 VITALS
OXYGEN SATURATION: 100 % | WEIGHT: 114 LBS | HEART RATE: 79 BPM | DIASTOLIC BLOOD PRESSURE: 64 MMHG | SYSTOLIC BLOOD PRESSURE: 102 MMHG | BODY MASS INDEX: 19.46 KG/M2 | HEIGHT: 64 IN | TEMPERATURE: 98 F

## 2025-04-08 DIAGNOSIS — Z91.414 HISTORY OF ABUSE BY INTIMATE PARTNER IN ADULTHOOD: ICD-10-CM

## 2025-04-08 DIAGNOSIS — M25.552 BILATERAL HIP PAIN: Primary | ICD-10-CM

## 2025-04-08 DIAGNOSIS — M54.50 CHRONIC MIDLINE LOW BACK PAIN WITHOUT SCIATICA: ICD-10-CM

## 2025-04-08 DIAGNOSIS — Z00.00 ANNUAL PHYSICAL EXAM: ICD-10-CM

## 2025-04-08 DIAGNOSIS — M25.551 BILATERAL HIP PAIN: Primary | ICD-10-CM

## 2025-04-08 DIAGNOSIS — G89.29 CHRONIC MIDLINE LOW BACK PAIN WITHOUT SCIATICA: ICD-10-CM

## 2025-04-08 LAB
BASOPHILS # BLD AUTO: 0.04 10*3/MM3 (ref 0–0.2)
BASOPHILS NFR BLD AUTO: 0.6 % (ref 0–1.5)
DEPRECATED RDW RBC AUTO: 43.3 FL (ref 37–54)
EOSINOPHIL # BLD AUTO: 0.07 10*3/MM3 (ref 0–0.4)
EOSINOPHIL NFR BLD AUTO: 1 % (ref 0.3–6.2)
ERYTHROCYTE [DISTWIDTH] IN BLOOD BY AUTOMATED COUNT: 12.4 % (ref 12.3–15.4)
HCT VFR BLD AUTO: 44.1 % (ref 34–46.6)
HGB BLD-MCNC: 14.7 G/DL (ref 12–15.9)
IMM GRANULOCYTES # BLD AUTO: 0.02 10*3/MM3 (ref 0–0.05)
IMM GRANULOCYTES NFR BLD AUTO: 0.3 % (ref 0–0.5)
LYMPHOCYTES # BLD AUTO: 1.39 10*3/MM3 (ref 0.7–3.1)
LYMPHOCYTES NFR BLD AUTO: 20.5 % (ref 19.6–45.3)
MCH RBC QN AUTO: 32 PG (ref 26.6–33)
MCHC RBC AUTO-ENTMCNC: 33.3 G/DL (ref 31.5–35.7)
MCV RBC AUTO: 95.9 FL (ref 79–97)
MONOCYTES # BLD AUTO: 0.47 10*3/MM3 (ref 0.1–0.9)
MONOCYTES NFR BLD AUTO: 6.9 % (ref 5–12)
NEUTROPHILS NFR BLD AUTO: 4.78 10*3/MM3 (ref 1.7–7)
NEUTROPHILS NFR BLD AUTO: 70.7 % (ref 42.7–76)
NRBC BLD AUTO-RTO: 0 /100 WBC (ref 0–0.2)
PLATELET # BLD AUTO: 231 10*3/MM3 (ref 140–450)
PMV BLD AUTO: 11.2 FL (ref 6–12)
RBC # BLD AUTO: 4.6 10*6/MM3 (ref 3.77–5.28)
WBC NRBC COR # BLD AUTO: 6.77 10*3/MM3 (ref 3.4–10.8)

## 2025-04-08 PROCEDURE — 80061 LIPID PANEL: CPT

## 2025-04-08 PROCEDURE — 80053 COMPREHEN METABOLIC PANEL: CPT

## 2025-04-08 PROCEDURE — 84443 ASSAY THYROID STIM HORMONE: CPT

## 2025-04-08 PROCEDURE — 85025 COMPLETE CBC W/AUTO DIFF WBC: CPT
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
## 2025-04-09 LAB
ALBUMIN SERPL-MCNC: 4 G/DL (ref 3.5–5.2)
ALBUMIN/GLOB SERPL: 1.3 G/DL
ALP SERPL-CCNC: 77 U/L (ref 39–117)
ALT SERPL W P-5'-P-CCNC: 15 U/L (ref 1–33)
ANION GAP SERPL CALCULATED.3IONS-SCNC: 8 MMOL/L (ref 5–15)
AST SERPL-CCNC: 18 U/L (ref 1–32)
BILIRUB SERPL-MCNC: 0.2 MG/DL (ref 0–1.2)
BUN SERPL-MCNC: 10 MG/DL (ref 6–20)
BUN/CREAT SERPL: 13.2 (ref 7–25)
CALCIUM SPEC-SCNC: 9.8 MG/DL (ref 8.6–10.5)
CHLORIDE SERPL-SCNC: 103 MMOL/L (ref 98–107)
CHOLEST SERPL-MCNC: 172 MG/DL (ref 0–200)
CO2 SERPL-SCNC: 26 MMOL/L (ref 22–29)
CREAT SERPL-MCNC: 0.76 MG/DL (ref 0.57–1)
EGFRCR SERPLBLD CKD-EPI 2021: 106.9 ML/MIN/1.73
GLOBULIN UR ELPH-MCNC: 3 GM/DL
GLUCOSE SERPL-MCNC: 72 MG/DL (ref 65–99)
HDLC SERPL-MCNC: 47 MG/DL (ref 40–60)
LDLC SERPL CALC-MCNC: 114 MG/DL (ref 0–100)
LDLC/HDLC SERPL: 2.41 {RATIO}
POTASSIUM SERPL-SCNC: 4.2 MMOL/L (ref 3.5–5.2)
PROT SERPL-MCNC: 7 G/DL (ref 6–8.5)
SODIUM SERPL-SCNC: 137 MMOL/L (ref 136–145)
TRIGL SERPL-MCNC: 58 MG/DL (ref 0–150)
TSH SERPL DL<=0.05 MIU/L-ACNC: 1.29 UIU/ML (ref 0.27–4.2)
VLDLC SERPL-MCNC: 11 MG/DL (ref 5–40)

## 2025-04-15 DIAGNOSIS — M25.551 BILATERAL HIP PAIN: Primary | ICD-10-CM

## 2025-04-15 DIAGNOSIS — Z91.414 HISTORY OF ABUSE BY INTIMATE PARTNER IN ADULTHOOD: ICD-10-CM

## 2025-04-15 DIAGNOSIS — M25.552 BILATERAL HIP PAIN: Primary | ICD-10-CM

## 2025-05-06 ENCOUNTER — HOSPITAL ENCOUNTER (OUTPATIENT)
Dept: MRI IMAGING | Facility: HOSPITAL | Age: 35
Discharge: HOME | End: 2025-05-06
Payer: COMMERCIAL

## 2025-05-06 DIAGNOSIS — M25.552 BILATERAL HIP PAIN: ICD-10-CM

## 2025-05-06 DIAGNOSIS — M25.551 BILATERAL HIP PAIN: ICD-10-CM

## 2025-05-06 DIAGNOSIS — Z91.414 HISTORY OF ABUSE BY INTIMATE PARTNER IN ADULTHOOD: ICD-10-CM

## 2025-05-06 PROCEDURE — 73721 MRI JNT OF LWR EXTRE W/O DYE: CPT
